# Patient Record
Sex: FEMALE | Race: WHITE | NOT HISPANIC OR LATINO | Employment: FULL TIME | URBAN - METROPOLITAN AREA
[De-identification: names, ages, dates, MRNs, and addresses within clinical notes are randomized per-mention and may not be internally consistent; named-entity substitution may affect disease eponyms.]

---

## 2020-12-15 ENCOUNTER — TELEMEDICINE (OUTPATIENT)
Dept: FAMILY MEDICINE CLINIC | Facility: TELEHEALTH | Age: 35
End: 2020-12-15

## 2020-12-15 DIAGNOSIS — L30.9 ECZEMA, UNSPECIFIED TYPE: Primary | ICD-10-CM

## 2020-12-15 PROCEDURE — 99213 OFFICE O/P EST LOW 20 MIN: CPT | Performed by: NURSE PRACTITIONER

## 2020-12-16 NOTE — PROGRESS NOTES
GABRIELLA Carpenter is a 35 y.o. female  presents with complaint of rash on right chin area for about 2 months. Itchy, red and flaky. Used triple antibiotic ointment for about 2 weeks which improved slightly. Area worsens when wearing a mask. Denies any other symptoms.     Review of Systems   Constitutional: Negative for fever.   HENT: Negative.    Respiratory: Negative for cough.    Cardiovascular: Negative.    Gastrointestinal: Negative.    Skin: Positive for rash (chin).       No past medical history on file.    No family history on file.           There were no vitals taken for this visit.    PHYSICAL EXAM  Physical Exam   Constitutional: She appears well-developed and well-nourished.   HENT:   Head: Normocephalic.       Nose: Nose normal.   Neck: Neck normal appearance.  Pulmonary/Chest: Effort normal.   Neurological: She is alert.   Psychiatric: She has a normal mood and affect. Her speech is normal.       Diagnoses and all orders for this visit:    1. Eczema, unspecified type (Primary)  -     triamcinolone (KENALOG) 0.1 % ointment; Apply  topically to the appropriate area as directed Daily As Needed for Rash for up to 5 days. chin  Dispense: 80 g; Refill: 0  -     mupirocin (Bactroban) 2 % ointment; Apply  topically to the appropriate area as directed 3 (Three) Times a Day for 7 days. chin  Dispense: 30 g; Refill: 0    *Use triamcinolone cream as prescribed. May use 1-2 times a day for the first 2-3 days, but then decrease use. Discussed with patient side effects of steroid cream and to use as little as possible.  Apply vaseline or aquaphor to area every night.   Wash mask often or use a new mask when possible.   She is concerned about impetigo which I discussed did not appear to be present at this time. Sent in mupirocin to use if honey-crust lesions do appear. She verbalized understanding.       FOLLOW-UP  As discussed during visit with [facility], if symptoms worsen or fail to improve, follow-up with  PCP/Urgent Care/Emergency Department.    Patient verbalizes understanding of medications, instructions for treatment and follow-up.    Lachelle Dunne, APRN  12/15/2020  19:07 EST    This visit was performed via Telehealth.  This patient has been instructed to follow-up with their primary care provider if their symptoms worsen or the treatment provided does not resolve their illness.

## 2020-12-16 NOTE — PATIENT INSTRUCTIONS
*Use triamcinolone cream as prescribed. May use 1-2 times a day for the first 2-3 days, but then decrease use. Use as little as possible due to side effects.   *Apply vaseline or aquaphor to area every night.   *Wash mask often or use a new mask when possible.   *Use mupirocin if honey-crusted lesions do appear which would indicate impetigo.    Eczema  Eczema is a broad term for a group of skin conditions that cause skin to become rough and inflamed. Each type of eczema has different triggers, symptoms, and treatments. Eczema of any type is usually itchy and symptoms range from mild to severe.  Eczema and its symptoms are not spread from person to person (are not contagious). It can appear on different parts of the body at different times. Your eczema may not look the same as someone else's eczema.  What are the types of eczema?  Atopic dermatitis  This is a long-term (chronic) skin disease that keeps coming back (recurring). Usual symptoms are dry skin and small, solid pimples that may swell and leak fluid (weep).  Contact dermatitis    This happens when something irritates the skin and causes a rash. The irritation can come from substances that you are allergic to (allergens), such as poison ivy, chemicals, or medicines that were applied to your skin.  Dyshidrotic eczema  This is a form of eczema on the hands and feet. It shows up as very itchy, fluid-filled blisters. It can affect people of any age, but is more common before age 40.  Hand eczema    This causes very itchy areas of skin on the palms and sides of the hands and fingers. This type of eczema is common in industrial jobs where you may be exposed to many different types of irritants.  Lichen simplex chronicus  This type of eczema occurs when a person constantly scratches one area of the body. Repeated scratching of the area leads to thickened skin (lichenification). Lichen simplex chronicus can occur along with other types of eczema. It is more common in  "adults, but may be seen in children as well.  Nummular eczema  This is a common type of eczema. It has no known cause. It typically causes a red, circular, crusty lesion (plaque) that may be itchy. Scratching may become a habit and can cause bleeding. Nummular eczema occurs most often in people of middle-age or older. It most often affects the hands.  Seborrheic dermatitis  This is a common skin disease that mainly affects the scalp. It may also affect any oily areas of the body, such as the face, sides of nose, eyebrows, ears, eyelids, and chest. It is marked by small scaling and redness of the skin (erythema). This can affect people of all ages. In infants, this condition is known as \"cradle cap.\"  Stasis dermatitis  This is a common skin disease that usually appears on the legs and feet. It most often occurs in people who have a condition that prevents blood from being pumped through the veins in the legs (chronic venous insufficiency). Stasis dermatitis is a chronic condition that needs long-term management.  How is eczema diagnosed?  Your health care provider will examine your skin and review your medical history. He or she may also give you skin patch tests. These tests involve taking patches that contain possible allergens and placing them on your back. He or she will then check in a few days to see if an allergic reaction occurred.  What are the common treatments?  Treatment for eczema is based on the type of eczema you have. Hydrocortisone steroid medicine can relieve itching quickly and help reduce inflammation. This medicine may be prescribed or obtained over-the-counter, depending on the strength of the medicine that is needed.  Follow these instructions at home:  · Take over-the-counter and prescription medicines only as told by your health care provider.  · Use creams or ointments to moisturize your skin. Do not use lotions.  · Learn what triggers or irritates your symptoms. Avoid these " things.  · Treat symptom flare-ups quickly.  · Do not itch your skin. This can make your rash worse.  · Keep all follow-up visits as told by your health care provider. This is important.  Where to find more information  · The American Academy of Dermatology: www.aad.org  · The National Eczema Association: www.nationaleczema.org  Contact a health care provider if:  · You have serious itching, even with treatment.  · You regularly scratch your skin until it bleeds.  · Your rash looks different than usual.  · Your skin is painful, swollen, or more red than usual.  · You have a fever.  Summary  · There are eight general types of eczema. Each type has different triggers.  · Eczema of any type causes itching that may range from mild to severe.  · Treatment varies based on the type of eczema you have. Hydrocortisone steroid medicine can help with itching and inflammation.  · Protecting your skin is the best way to prevent eczema. Use moisturizers and lotions. Avoid triggers and irritants, and treat flare-ups quickly.  This information is not intended to replace advice given to you by your health care provider. Make sure you discuss any questions you have with your health care provider.  Document Revised: 11/30/2018 Document Reviewed: 05/03/2018  Elsevier Patient Education © 2020 Elsevier Inc.

## 2020-12-28 ENCOUNTER — TELEPHONE (OUTPATIENT)
Dept: NEUROLOGY | Facility: CLINIC | Age: 35
End: 2020-12-28

## 2020-12-28 DIAGNOSIS — G43.719 INTRACTABLE CHRONIC MIGRAINE WITHOUT AURA AND WITHOUT STATUS MIGRAINOSUS: Primary | ICD-10-CM

## 2020-12-28 RX ORDER — SUMATRIPTAN 100 MG/1
100 TABLET, FILM COATED ORAL ONCE AS NEEDED
Qty: 14 TABLET | Refills: 3 | Status: SHIPPED | OUTPATIENT
Start: 2020-12-28 | End: 2021-05-26 | Stop reason: SDUPTHER

## 2020-12-28 NOTE — TELEPHONE ENCOUNTER
Called and spoke with patient. Scheduled her an appointment for December 30th at 8:30am.     Requested records from [facility].     Patient stated she is completley out of medicine.     Patient is currently taking Imitrex 100mg    [facility]

## 2020-12-28 NOTE — TELEPHONE ENCOUNTER
Does she have an upcoming appointment with me? I have no documentation from [facility] to review. Can we get that?

## 2020-12-28 NOTE — TELEPHONE ENCOUNTER
PATIENT CALLED AND STATED SHE USED TO SEE [facility]UER[facility] IN [place] AND WAS NEEDING A REFILL ON RX IMITREX.     PLEASE ADVISE PATIENT WITH AN UPDATE.    PH: [phone]

## 2020-12-29 NOTE — TELEPHONE ENCOUNTER
NUVIA CALLED AGAIN WANTING TO KNOW ABOUT MEDICINE. I ADVISED HER OF THE PREVIOUS NOTES THAT ARE IN THIS ENCOUNTER AND ALSO OF HER UPCOMING APPT.    PLEASE ADVISE PATIENT WITH AN UPDATE. SHE IS WORRIED ABOUT NOT HAVING HER MEDICINE.    PH: [phone]

## 2020-12-30 ENCOUNTER — OFFICE VISIT (OUTPATIENT)
Dept: NEUROLOGY | Facility: CLINIC | Age: 35
End: 2020-12-30

## 2020-12-30 VITALS
DIASTOLIC BLOOD PRESSURE: 80 MMHG | HEIGHT: 66 IN | WEIGHT: 167 LBS | SYSTOLIC BLOOD PRESSURE: 100 MMHG | BODY MASS INDEX: 26.84 KG/M2 | HEART RATE: 81 BPM | TEMPERATURE: 97.5 F | OXYGEN SATURATION: 98 %

## 2020-12-30 DIAGNOSIS — G47.19 EXCESSIVE DAYTIME SLEEPINESS: ICD-10-CM

## 2020-12-30 DIAGNOSIS — G43.719 INTRACTABLE CHRONIC MIGRAINE WITHOUT AURA AND WITHOUT STATUS MIGRAINOSUS: Primary | ICD-10-CM

## 2020-12-30 DIAGNOSIS — G43.829 MENSTRUAL MIGRAINE WITHOUT STATUS MIGRAINOSUS, NOT INTRACTABLE: ICD-10-CM

## 2020-12-30 PROCEDURE — 99214 OFFICE O/P EST MOD 30 MIN: CPT | Performed by: NURSE PRACTITIONER

## 2020-12-30 PROCEDURE — 96372 THER/PROPH/DIAG INJ SC/IM: CPT | Performed by: NURSE PRACTITIONER

## 2020-12-30 RX ORDER — PREDNISONE 20 MG/1
40 TABLET ORAL DAILY
Qty: 10 TABLET | Refills: 0 | Status: SHIPPED | OUTPATIENT
Start: 2020-12-30 | End: 2021-01-04

## 2020-12-30 NOTE — PATIENT INSTRUCTIONS
Migraine Headache  A migraine headache is a very strong throbbing pain on one side or both sides of your head. This type of headache can also cause other symptoms. It can last from 4 hours to 3 days. Talk with your doctor about what things may bring on (trigger) this condition.  What are the causes?  The exact cause of this condition is not known. This condition may be triggered or caused by:  · Drinking alcohol.  · Smoking.  · Taking medicines, such as:  ? Medicine used to treat chest pain (nitroglycerin).  ? Birth control pills.  ? Estrogen.  ? Some blood pressure medicines.  · Eating or drinking certain products.  · Doing physical activity.  Other things that may trigger a migraine headache include:  · Having a menstrual period.  · Pregnancy.  · Hunger.  · Stress.  · Not getting enough sleep or getting too much sleep.  · Weather changes.  TirednGalcanezumab injection  What is this medicine?  GALCANEZUMAB (gal ka RACHEL ue mab) is used to prevent migraines and treat cluster headaches.  This medicine may be used for other purposes; ask your health care provider or pharmacist if you have questions.  COMMON BRAND NAME(S): Emgality  What should I tell my health care provider before I take this medicine?  They need to know if you have any of these conditions:  · an unusual or allergic reaction to galcanezumab, other medicines, foods, dyes, or preservatives  · pregnant or trying to get pregnant  · breast-feeding  How should I use this medicine?  This medicine is for injection under the skin. You will be taught how to prepare and give this medicine. Use exactly as directed. Take your medicine at regular intervals. Do not take your medicine more often than directed.  It is important that you put your used needles and syringes in a special sharps container. Do not put them in a trash can. If you do not have a sharps container, call your pharmacist or healthcare provider to get one.  Talk to your pediatrician regarding the use  of this medicine in children. Special care may be needed.  Overdosage: If you think you have taken too much of this medicine contact a poison control center or emergency room at once.  NOTE: This medicine is only for you. Do not share this medicine with others.  What if I miss a dose?  If you miss a dose, take it as soon as you can. If it is almost time for your next dose, take only that dose. Do not take double or extra doses.  What may interact with this medicine?  Interactions are not expected.  This list may not describe all possible interactions. Give your health care provider a list of all the medicines, herbs, non-prescription drugs, or dietary supplements you use. Also tell them if you smoke, drink alcohol, or use illegal drugs. Some items may interact with your medicine.  What should I watch for while using this medicine?  Tell your doctor or healthcare professional if your symptoms do not start to get better or if they get worse.  What side effects may I notice from receiving this medicine?  Side effects that you should report to your doctor or health care professional as soon as possible:  · allergic reactions like skin rash, itching or hives, swelling of the face, lips, or tongue  Side effects that usually do not require medical attention (report these to your doctor or health care professional if they continue or are bothersome):  · pain, redness, or irritation at site where injected  This list may not describe all possible side effects. Call your doctor for medical advice about side effects. You may report side effects to FDA at 1-550-FDA-0212.  Where should I keep my medicine?  Keep out of the reach of children.  You will be instructed on how to store this medicine. Throw away any unused medicine after the expiration date on the label.  NOTE: This sheet is a summary. It may not cover all possible information. If you have questions about this medicine, talk to your doctor, pharmacist, or health care  provider.  © 2020 Elsevier/Gold Standard (2019-06-05 12:03:23)    Sleep Apnea  Sleep apnea affects breathing during sleep. It causes breathing to stop for a short time or to become shallow. It can also increase the risk of:  Heart attack.  Stroke.  Being very overweight (obese).  Diabetes.  Heart failure.  Irregular heartbeat.  The goal of treatment is to help you breathe normally again.  What are the causes?  There are three kinds of sleep apnea:  Obstructive sleep apnea. This is caused by a blocked or collapsed airway.  Central sleep apnea. This happens when the brain does not send the right signals to the muscles that control breathing.  Mixed sleep apnea. This is a combination of obstructive and central sleep apnea.  The most common cause of this condition is a collapsed or blocked airway. This can happen if:  Your throat muscles are too relaxed.  Your tongue and tonsils are too large.  You are overweight.  Your airway is too small.  What increases the risk?  Being overweight.  Smoking.  Having a small airway.  Being older.  Being male.  Drinking alcohol.  Taking medicines to calm yourself (sedatives or tranquilizers).  Having family members with the condition.  What are the signs or symptoms?  Trouble staying asleep.  Being sleepy or tired during the day.  Getting angry a lot.  Loud snoring.  Headaches in the morning.  Not being able to focus your mind (concentrate).  Forgetting things.  Less interest in sex.  Mood swings.  Personality changes.  Feelings of sadness (depression).  Waking up a lot during the night to pee (urinate).  Dry mouth.  Sore throat.  How is this diagnosed?  Your medical history.  A physical exam.  A test that is done when you are sleeping (sleep study). The test is most often done in a sleep lab but may also be done at home.  How is this treated?    Sleeping on your side.  Using a medicine to get rid of mucus in your nose (decongestant).  Avoiding the use of alcohol, medicines to help you  relax, or certain pain medicines (narcotics).  Losing weight, if needed.  Changing your diet.  Not smoking.  Using a machine to open your airway while you sleep, such as:  An oral appliance. This is a mouthpiece that shifts your lower jaw forward.  A CPAP device. This device blows air through a mask when you breathe out (exhale).  An EPAP device. This has valves that you put in each nostril.  A BPAP device. This device blows air through a mask when you breathe in (inhale) and breathe out.  Having surgery if other treatments do not work.  It is important to get treatment for sleep apnea. Without treatment, it can lead to:  High blood pressure.  Coronary artery disease.  In men, not being able to have an erection (impotence).  Reduced thinking ability.  Follow these instructions at home:  Lifestyle  Make changes that your doctor recommends.  Eat a healthy diet.  Lose weight if needed.  Avoid alcohol, medicines to help you relax, and some pain medicines.  Do not use any products that contain nicotine or tobacco, such as cigarettes, e-cigarettes, and chewing tobacco. If you need help quitting, ask your doctor.  General instructions  Take over-the-counter and prescription medicines only as told by your doctor.  If you were given a machine to use while you sleep, use it only as told by your doctor.  If you are having surgery, make sure to tell your doctor you have sleep apnea. You may need to bring your device with you.  Keep all follow-up visits as told by your doctor. This is important.  Contact a doctor if:  The machine that you were given to use during sleep bothers you or does not seem to be working.  You do not get better.  You get worse.  Get help right away if:  Your chest hurts.  You have trouble breathing in enough air.  You have an uncomfortable feeling in your back, arms, or stomach.  You have trouble talking.  One side of your body feels weak.  A part of your face is hanging down.  These symptoms may be an  emergency. Do not wait to see if the symptoms will go away. Get medical help right away. Call your local emergency services (911 in the [place] Do not drive yourself to the hospital.  Summary  This condition affects breathing during sleep.  The most common cause is a collapsed or blocked airway.  The goal of treatment is to help you breathe normally while you sleep.  This information is not intended to replace advice given to you by your health care provider. Make sure you discuss any questions you have with your health care provider.  Document Revised: 10/04/2019 Document Reviewed: 08/13/2019  GoodRx Patient Education © 2020 [employer] Inc.  · ess (fatigue).  What increases the risk?  · Being 25-55 years old.  · Being female.  · Having a family history of migraine headaches.  · Being .  · Having depression or anxiety.  · Being very overweight.  What are the signs or symptoms?  · A throbbing pain. This pain may:  ? Happen in any area of the head, such as on one side or both sides.  ? Make it hard to do daily activities.  ? Get worse with physical activity.  ? Get worse around bright lights or loud noises.  · Other symptoms may include:  ? Feeling sick to your stomach (nauseous).  ? Vomiting.  ? Dizziness.  ? Being sensitive to bright lights, loud noises, or smells.  · Before you get a migraine headache, you may get warning signs (an aura). An aura may include:  ? Seeing flashing lights or having blind spots.  ? Seeing bright spots, halos, or zigzag lines.  ? Having tunnel vision or blurred vision.  ? Having numbness or a tingling feeling.  ? Having trouble talking.  ? Having weak muscles.  · Some people have symptoms after a migraine headache (postdromal phase), such as:  ? Tiredness.  ? Trouble thinking (concentrating).  How is this treated?  · Taking medicines that:  ? Relieve pain.  ? Relieve the feeling of being sick to your stomach.  ? Prevent migraine headaches.  · Treatment may also include:  ? Having  acupuncture.  ? Avoiding foods that bring on migraine headaches.  ? Learning ways to control your body functions (biofeedback).  ? Therapy to help you know and deal with negative thoughts (cognitive behavioral therapy).  Follow these instructions at home:  Medicines  · Take over-the-counter and prescription medicines only as told by your doctor.  · Ask your doctor if the medicine prescribed to you:  ? Requires you to avoid driving or using heavy machinery.  ? Can cause trouble pooping (constipation). You may need to take these steps to prevent or treat trouble pooping:  § Drink enough fluid to keep your pee (urine) pale yellow.  § Take over-the-counter or prescription medicines.  § Eat foods that are high in fiber. These include beans, whole grains, and fresh fruits and vegetables.  § Limit foods that are high in fat and sugar. These include fried or sweet foods.  Lifestyle  · Do not drink alcohol.  · Do not use any products that contain nicotine or tobacco, such as cigarettes, e-cigarettes, and chewing tobacco. If you need help quitting, ask your doctor.  · Get at least 8 hours of sleep every night.  · Limit and deal with stress.  General instructions         · Keep a journal to find out what may bring on your migraine headaches. For example, write down:  ? What you eat and drink.  ? How much sleep you get.  ? Any change in what you eat or drink.  ? Any change in your medicines.  · If you have a migraine headache:  ? Avoid things that make your symptoms worse, such as bright lights.  ? It may help to lie down in a dark, quiet room.  ? Do not drive or use heavy machinery.  ? Ask your doctor what activities are safe for you.  · Keep all follow-up visits as told by your doctor. This is important.  Contact a doctor if:  · You get a migraine headache that is different or worse than others you have had.  · You have more than 15 headache days in one month.  Get help right away if:  · Your migraine headache gets very  bad.  · Your migraine headache lasts longer than 72 hours.  · You have a fever.  · You have a stiff neck.  · You have trouble seeing.  · Your muscles feel weak or like you cannot control them.  · You start to lose your balance a lot.  · You start to have trouble walking.  · You pass out (faint).  · You have a seizure.  Summary  · A migraine headache is a very strong throbbing pain on one side or both sides of your head. These headaches can also cause other symptoms.  · This condition may be treated with medicines and changes to your lifestyle.  · Keep a journal to find out what may bring on your migraine headaches.  · Contact a doctor if you get a migraine headache that is different or worse than others you have had.  · Contact your doctor if you have more than 15 headache days in a month.  This information is not intended to replace advice given to you by your health care provider. Make sure you discuss any questions you have with your health care provider.  Document Revised: 04/10/2020 Document Reviewed: 01/30/2020  Elsevier Patient Education © 2020 Elsevier Inc.

## 2020-12-30 NOTE — PROGRESS NOTES
New Headache Note      Patient Name: Zeenat Carpenter  : 1985   MRN: [id number]     Referring Physician: No ref. provider found    Chief Complaint:    Chief Complaint   Patient presents with   • Consult     patient in office to establish care for migraines.        History of Present Illness: Zeenat Carpenter is a 35 y.o. female who is here today to establish care with Neurology for headaches.  She was last seen by me at [facility] on 3/6/2018.  She has had 10/30 headache days in the past month with 4-5 being migraines.  She is taking Imitrex 100mg as needed and this does help for about a day and then the migraine will often return.  She describes her migraines as mostly frontotemporal and behind her eyes, some times only on one side, not preceded by an aura.  She feels she has cluster migraines 4 days before she starts her menstrual period.  These migraines are quite severe.  She had an MRI head ordered at her previous visit but she did not have that done.      Sleep questionnaire reviewed.  [place] score 11.  She denies snoring.  She does not wake up feeling rested.  She wakes up with a dry mouth at times.  She wakes up a couple of times during the night.  She falls asleep easily.      Preventative medications tried: Topiramate experienced severe mental cloudiness, Nortriptyline did not help much  Abortive medications tried: Tylenol, Ibuprofen, Fioricet    Subjective      Review of Systems:   Review of Systems   Constitutional: Negative for fatigue, fever, unexpected weight gain and unexpected weight loss.   HENT: Negative for hearing loss, sore throat, swollen glands, tinnitus and trouble swallowing.    Eyes: Negative for blurred vision, double vision, photophobia and visual disturbance.   Respiratory: Negative for cough, chest tightness and shortness of breath.    Cardiovascular: Negative for chest pain, palpitations and leg swelling.   Gastrointestinal: Negative for constipation,  diarrhea and nausea.   Endocrine: Negative for cold intolerance and heat intolerance.   Musculoskeletal: Negative for gait problem, neck pain and neck stiffness.   Skin: Negative for color change and rash.   Allergic/Immunologic: Negative for environmental allergies and food allergies.   Neurological: Positive for headache. Negative for dizziness, syncope, facial asymmetry, speech difficulty, weakness, memory problem and confusion.   Psychiatric/Behavioral: Negative for agitation, behavioral problems and depressed mood. The patient is not nervous/anxious.        Past Medical History:   Past Medical History:   Diagnosis Date   • Migraine        Past Surgical History: History reviewed. No pertinent surgical history.    Family History:   Family History   Problem Relation Age of Onset   • Migraines Sister        Social History:   Social History     Socioeconomic History   • Marital status: Unknown     Spouse name: Not on file   • Number of children: Not on file   • Years of education: Not on file   • Highest education level: Not on file   Tobacco Use   • Smoking status: Never Smoker   • Smokeless tobacco: Never Used   Substance and Sexual Activity   • Alcohol use: Yes     Comment: occas   • Drug use: Never   • Sexual activity: Defer       Medications:     Current Outpatient Medications:   •  SUMAtriptan (Imitrex) 100 MG tablet, Take 1 tablet by mouth 1 (One) Time As Needed for Migraine., Disp: 14 tablet, Rfl: 3  •  galcanezumab-gnlm (EMGALITY) 120 MG/ML prefilled syringe, Inject 2 mL under the skin into the appropriate area as directed 1 (One) Time for 1 dose., Disp: 1.12 mL, Rfl: 0  •  galcanezumab-gnlm (EMGALITY) 120 MG/ML prefilled syringe, Inject 1 mL under the skin into the appropriate area as directed Every 28 (Twenty-Eight) Days., Disp: 1.12 mL, Rfl: 11    Current Facility-Administered Medications:   •  Galcanezumab-gnlm solution prefilled syringe 240 mg, 240 mg, Subcutaneous, Once, Roxanne Connolly,  "APRN    Allergies:   Allergies   Allergen Reactions   • Aspirin Swelling     Swells patients throat shut       Objective     Physical Exam:  Vital Signs:   Vitals:    12/30/20 0843   BP: 100/80   BP Location: Left arm   Patient Position: Sitting   Cuff Size: Adult   Pulse: 81   Temp: 97.5 °F (36.4 °C)   SpO2: 98%   Weight: 75.8 kg (167 lb)   Height: 167.6 cm (66\")   PainSc: 0-No pain     Body mass index is 26.95 kg/m².     Physical Exam  Vitals signs and nursing note reviewed.   Constitutional:       General: She is not in acute distress.     Appearance: She is well-developed. She is not diaphoretic.   HENT:      Head: Normocephalic and atraumatic.   Eyes:      Conjunctiva/sclera: Conjunctivae normal.      Pupils: Pupils are equal, round, and reactive to light.   Neck:      Musculoskeletal: Normal range of motion and neck supple.   Cardiovascular:      Rate and Rhythm: Normal rate and regular rhythm.      Heart sounds: Normal heart sounds. No murmur. No friction rub. No gallop.    Pulmonary:      Effort: Pulmonary effort is normal. No respiratory distress.      Breath sounds: Normal breath sounds. No wheezing or rales.   Musculoskeletal: Normal range of motion.   Skin:     General: Skin is warm and dry.      Findings: No rash.   Neurological:      General: No focal deficit present.      Mental Status: She is alert and oriented to person, place, and time.      Coordination: Finger-Nose-Finger Test normal.      Gait: Gait is intact.   Psychiatric:         Speech: Speech normal.         Behavior: Behavior normal.         Thought Content: Thought content normal.         Neurologic Exam     Mental Status   Oriented to person, place, and time.   Attention: normal. Concentration: normal.   Speech: speech is normal   Level of consciousness: alert  Knowledge: good.     Cranial Nerves   Cranial nerves II through XII intact.     CN II   Visual fields full to confrontation.     CN III, IV, VI   Pupils are equal, round, and " reactive to light.  Right pupil: Size: 3 mm. Shape: regular. Reactivity: brisk.   Left pupil: Size: 3 mm. Shape: regular. Reactivity: brisk.   CN III: no CN III palsy  CN VI: no CN VI palsy  Nystagmus: none     CN V   Facial sensation intact.     CN VII   Facial expression full, symmetric.     CN VIII   CN VIII normal.     CN IX, X   CN IX normal.   CN X normal.     CN XI   CN XI normal.     CN XII   CN XII normal.     Motor Exam   Muscle bulk: normal  Overall muscle tone: normal    Strength   Right biceps: 5/5  Left biceps: 5/5  Right triceps: 5/5  Left triceps: 5/5  Right quadriceps: 5/5  Left quadriceps: 5/5  Right hamstrin/5  Left hamstrin/5    Sensory Exam   Light touch normal.     Gait, Coordination, and Reflexes     Gait  Gait: normal    Coordination   Finger to nose coordination: normal    Tremor   Resting tremor: absent  Intention tremor: absent  Action tremor: absent      Assessment / Plan      Assessment/Plan:   Diagnoses and all orders for this visit:    1. Intractable chronic migraine without aura and without status migrainosus (Primary)  -     Galcanezumab-gnlm solution prefilled syringe 240 mg  -     galcanezumab-gnlm (EMGALITY) 120 MG/ML prefilled syringe; Inject 2 mL under the skin into the appropriate area as directed 1 (One) Time for 1 dose.  Dispense: 1.12 mL; Refill: 0  -     galcanezumab-gnlm (EMGALITY) 120 MG/ML prefilled syringe; Inject 1 mL under the skin into the appropriate area as directed Every 28 (Twenty-Eight) Days.  Dispense: 1.12 mL; Refill: 11  -     MRI Brain With & Without Contrast; Future  -     Home Sleep Study; Future  - Printed patient education on Migraines, ELIECER and Emgality provided today.     2. Menstrual migraine without status migrainosus, not intractable  -     galcanezumab-gnlm (EMGALITY) 120 MG/ML prefilled syringe; Inject 2 mL under the skin into the appropriate area as directed 1 (One) Time for 1 dose.  Dispense: 1.12 mL; Refill: 0    3. Excessive daytime  sleepiness  -     Home Sleep Study; Future  - Advised patient to avoid driving if drowsy.     4. BMI 27.0-27.9,adult  -     Home Sleep Study; Future         Follow Up:   Return in about 4 weeks (around 1/27/2021) for Recheck.    TED Matos, FNP-C  [facility]       Please note that portions of this note may have been completed with a voice recognition program. Efforts were made to edit the dictations, but occasionally words are mistranscribed.

## 2021-01-11 ENCOUNTER — HOSPITAL ENCOUNTER (OUTPATIENT)
Dept: SLEEP MEDICINE | Facility: HOSPITAL | Age: 36
Discharge: HOME | End: 2021-01-11
Admitting: NURSE PRACTITIONER

## 2021-01-11 DIAGNOSIS — G43.719 INTRACTABLE CHRONIC MIGRAINE WITHOUT AURA AND WITHOUT STATUS MIGRAINOSUS: ICD-10-CM

## 2021-01-11 DIAGNOSIS — G47.19 EXCESSIVE DAYTIME SLEEPINESS: ICD-10-CM

## 2021-01-11 PROCEDURE — 95806 SLEEP STUDY UNATT&RESP EFFT: CPT | Performed by: INTERNAL MEDICINE

## 2021-01-11 PROCEDURE — 95806 SLEEP STUDY UNATT&RESP EFFT: CPT

## 2021-01-22 ENCOUNTER — HOSPITAL ENCOUNTER (OUTPATIENT)
Dept: MRI IMAGING | Facility: HOSPITAL | Age: 36
Discharge: HOME | End: 2021-01-22
Admitting: NURSE PRACTITIONER

## 2021-01-22 DIAGNOSIS — G43.719 INTRACTABLE CHRONIC MIGRAINE WITHOUT AURA AND WITHOUT STATUS MIGRAINOSUS: ICD-10-CM

## 2021-01-22 PROCEDURE — 70553 MRI BRAIN STEM W/O & W/DYE: CPT

## 2021-01-22 PROCEDURE — 0 GADOBENATE DIMEGLUMINE 529 MG/ML SOLUTION: Performed by: NURSE PRACTITIONER

## 2021-01-22 PROCEDURE — A9577 INJ MULTIHANCE: HCPCS | Performed by: NURSE PRACTITIONER

## 2021-01-22 RX ADMIN — GADOBENATE DIMEGLUMINE 15 ML: 529 INJECTION, SOLUTION INTRAVENOUS at 10:43

## 2021-01-27 ENCOUNTER — OFFICE VISIT (OUTPATIENT)
Dept: NEUROLOGY | Facility: CLINIC | Age: 36
End: 2021-01-27

## 2021-01-27 VITALS
SYSTOLIC BLOOD PRESSURE: 100 MMHG | OXYGEN SATURATION: 98 % | HEART RATE: 80 BPM | HEIGHT: 66 IN | WEIGHT: 165 LBS | DIASTOLIC BLOOD PRESSURE: 80 MMHG | BODY MASS INDEX: 26.52 KG/M2 | TEMPERATURE: 97.3 F

## 2021-01-27 DIAGNOSIS — G43.719 INTRACTABLE CHRONIC MIGRAINE WITHOUT AURA AND WITHOUT STATUS MIGRAINOSUS: Primary | ICD-10-CM

## 2021-01-27 PROCEDURE — 99213 OFFICE O/P EST LOW 20 MIN: CPT | Performed by: NURSE PRACTITIONER

## 2021-01-27 NOTE — PROGRESS NOTES
Follow Up Office Visit      Patient Name: Zeenat Carpenter  : 1985   MRN: [id number]     Chief Complaint:    Chief Complaint   Patient presents with   • Follow-up     patient in office to follow up on migraines.        History of Present Illness: Zeenat Carpenter is a 35 y.o. female who is here today to follow up with migraines.  She had a dose of Emgality at her previous appointment and is due her next injection tomorrow-she will be picking that up from the pharmacy.  She has had 1 migraine in the past month and that was around the time of her period.  She had also had a couple of glasses of wine and thinks that could have caused the migraine.  She took Imitrex with relief.  She had a home sleep study on 2021 and it showed an AHI of 1.2/hour.  She feels she is doing much better since her last visit.      Following taken from previous visit note: Zeenat Carpenter is a 35 y.o. female who is here today to establish care with Neurology for headaches.  She was last seen by me at [facility] on 3/6/2018.  She has had 10/30 headache days in the past month with 4-5 being migraines.  She is taking Imitrex 100mg as needed and this does help for about a day and then the migraine will often return.  She describes her migraines as mostly frontotemporal and behind her eyes, some times only on one side, not preceded by an aura.  She feels she has cluster migraines 4 days before she starts her menstrual period.  These migraines are quite severe.  She had an MRI head ordered at her previous visit but she did not have that done.       Sleep questionnaire reviewed.  [place] score 11.  She denies snoring.  She does not wake up feeling rested.  She wakes up with a dry mouth at times.  She wakes up a couple of times during the night.  She falls asleep easily.       Preventative medications tried: Topiramate experienced severe mental cloudiness, Nortriptyline did not help much  Abortive medications tried:  Tylenol, Ibuprofen, Fioricet    Subjective      Review of Systems:   Review of Systems   Constitutional: Negative for chills, fatigue and fever.   HENT: Negative for facial swelling, hearing loss, sore throat, tinnitus and trouble swallowing.    Eyes: Negative for blurred vision, double vision, photophobia and visual disturbance.   Respiratory: Negative for cough, chest tightness and shortness of breath.    Cardiovascular: Negative for chest pain, palpitations and leg swelling.   Gastrointestinal: Negative for abdominal pain, nausea and vomiting.   Endocrine: Negative for cold intolerance and heat intolerance.   Musculoskeletal: Negative for gait problem, neck pain and neck stiffness.   Skin: Negative for color change and rash.   Allergic/Immunologic: Negative for environmental allergies and food allergies.   Neurological: Positive for headache. Negative for dizziness, syncope, speech difficulty, weakness, light-headedness, numbness and memory problem.   Psychiatric/Behavioral: Negative for behavioral problems, sleep disturbance and depressed mood. The patient is not nervous/anxious.        I have reviewed and the following portions of the patient's history were updated as appropriate: past family history, past medical history, past social history, past surgical history and problem list.    Medications:     Current Outpatient Medications:   •  galcanezumab-gnlm (EMGALITY) 120 MG/ML prefilled syringe, Inject 1 mL under the skin into the appropriate area as directed Every 28 (Twenty-Eight) Days., Disp: 1.12 mL, Rfl: 11  •  SUMAtriptan (Imitrex) 100 MG tablet, Take 1 tablet by mouth 1 (One) Time As Needed for Migraine., Disp: 14 tablet, Rfl: 3    Allergies:   Allergies   Allergen Reactions   • Aspirin Swelling     Swells patients throat shut       Objective     Physical Exam:  Vital Signs:   Vitals:    01/27/21 0835   BP: 100/80   BP Location: Left arm   Patient Position: Sitting   Cuff Size: Adult   Pulse: 80   Temp:  "97.3 °F (36.3 °C)   SpO2: 98%   Weight: 74.8 kg (165 lb)   Height: 167.6 cm (66\")   PainSc: 0-No pain     Body mass index is 26.63 kg/m².    Physical Exam  Vitals signs and nursing note reviewed.   Constitutional:       General: She is not in acute distress.     Appearance: Normal appearance. She is well-developed. She is not diaphoretic.   HENT:      Head: Normocephalic and atraumatic.   Eyes:      Conjunctiva/sclera: Conjunctivae normal.      Pupils: Pupils are equal, round, and reactive to light.   Neck:      Musculoskeletal: Neck supple.   Cardiovascular:      Rate and Rhythm: Normal rate and regular rhythm.      Heart sounds: Normal heart sounds. No murmur. No friction rub. No gallop.    Pulmonary:      Effort: Pulmonary effort is normal. No respiratory distress.   Musculoskeletal: Normal range of motion.   Skin:     General: Skin is warm and dry.      Findings: No rash.   Neurological:      Mental Status: She is alert and oriented to person, place, and time.   Psychiatric:         Behavior: Behavior normal.         Thought Content: Thought content normal.         Neurologic Exam     Mental Status   Oriented to person, place, and time.     Cranial Nerves     CN III, IV, VI   Pupils are equal, round, and reactive to light.       Assessment / Plan      Assessment/Plan:   Diagnoses and all orders for this visit:    1. Intractable chronic migraine without aura and without status migrainosus (Primary)  - Continue current treatment plan with Emgality and Imitrex. Try to avoid triggers.     2. BMI 26.0-26.9,adult         Follow Up:   Return in about 4 months (around 5/27/2021) for Recheck.    TED Matos, FNP-C  [facility]       Please note that portions of this note may have been completed with a voice recognition program. Efforts were made to edit the dictations, but occasionally words are mistranscribed.   "

## 2021-04-01 ENCOUNTER — OFFICE VISIT (OUTPATIENT)
Dept: INTERNAL MEDICINE | Facility: CLINIC | Age: 36
End: 2021-04-01

## 2021-04-01 VITALS
OXYGEN SATURATION: 100 % | DIASTOLIC BLOOD PRESSURE: 78 MMHG | RESPIRATION RATE: 15 BRPM | SYSTOLIC BLOOD PRESSURE: 111 MMHG | BODY MASS INDEX: 25.88 KG/M2 | HEART RATE: 69 BPM | TEMPERATURE: 97.7 F | HEIGHT: 66 IN | WEIGHT: 161 LBS

## 2021-04-01 DIAGNOSIS — Z00.00 PHYSICAL EXAM, ANNUAL: Primary | ICD-10-CM

## 2021-04-01 DIAGNOSIS — Z13.29 SCREENING FOR ENDOCRINE, NUTRITIONAL, METABOLIC AND IMMUNITY DISORDER: ICD-10-CM

## 2021-04-01 DIAGNOSIS — Z13.228 SCREENING FOR ENDOCRINE, NUTRITIONAL, METABOLIC AND IMMUNITY DISORDER: ICD-10-CM

## 2021-04-01 DIAGNOSIS — F33.0 MILD EPISODE OF RECURRENT MAJOR DEPRESSIVE DISORDER (HCC): ICD-10-CM

## 2021-04-01 DIAGNOSIS — R21 RASH/SKIN ERUPTION: ICD-10-CM

## 2021-04-01 DIAGNOSIS — F41.9 ANXIETY: ICD-10-CM

## 2021-04-01 DIAGNOSIS — Z13.21 SCREENING FOR ENDOCRINE, NUTRITIONAL, METABOLIC AND IMMUNITY DISORDER: ICD-10-CM

## 2021-04-01 DIAGNOSIS — Z13.0 SCREENING FOR ENDOCRINE, NUTRITIONAL, METABOLIC AND IMMUNITY DISORDER: ICD-10-CM

## 2021-04-01 DIAGNOSIS — Z11.59 NEED FOR HEPATITIS C SCREENING TEST: ICD-10-CM

## 2021-04-01 PROCEDURE — 99395 PREV VISIT EST AGE 18-39: CPT | Performed by: NURSE PRACTITIONER

## 2021-04-01 RX ORDER — BUPROPION HYDROCHLORIDE 150 MG/1
150 TABLET ORAL DAILY
Qty: 30 TABLET | Refills: 1 | Status: SHIPPED | OUTPATIENT
Start: 2021-04-01 | End: 2021-06-01

## 2021-04-01 RX ORDER — HYDROXYZINE PAMOATE 25 MG/1
25 CAPSULE ORAL 3 TIMES DAILY PRN
COMMUNITY
End: 2021-05-26

## 2021-04-01 RX ORDER — DOXYCYCLINE 100 MG/1
100 TABLET ORAL DAILY
Qty: 30 TABLET | Refills: 0 | Status: SHIPPED | OUTPATIENT
Start: 2021-04-01 | End: 2021-05-01

## 2021-04-01 NOTE — PATIENT INSTRUCTIONS
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
## 2021-04-12 NOTE — PROGRESS NOTES
Please contact patient let her know results of Pap were negative but transformation zone was insufficient recommend repeat annual Pap.

## 2021-05-13 ENCOUNTER — OFFICE VISIT (OUTPATIENT)
Dept: BEHAVIORAL HEALTH | Facility: CLINIC | Age: 36
End: 2021-05-13

## 2021-05-13 VITALS
WEIGHT: 157 LBS | BODY MASS INDEX: 25.23 KG/M2 | HEIGHT: 66 IN | DIASTOLIC BLOOD PRESSURE: 68 MMHG | SYSTOLIC BLOOD PRESSURE: 116 MMHG

## 2021-05-13 DIAGNOSIS — F41.1 GENERALIZED ANXIETY DISORDER: ICD-10-CM

## 2021-05-13 DIAGNOSIS — F33.0 MILD EPISODE OF RECURRENT MAJOR DEPRESSIVE DISORDER (HCC): Primary | ICD-10-CM

## 2021-05-13 PROCEDURE — 90791 PSYCH DIAGNOSTIC EVALUATION: CPT | Performed by: COUNSELOR
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
## 2021-05-19 ENCOUNTER — OFFICE VISIT (OUTPATIENT)
Dept: BEHAVIORAL HEALTH | Facility: CLINIC | Age: 36
End: 2021-05-19

## 2021-05-19 VITALS
HEIGHT: 66 IN | DIASTOLIC BLOOD PRESSURE: 62 MMHG | WEIGHT: 157.6 LBS | SYSTOLIC BLOOD PRESSURE: 110 MMHG | BODY MASS INDEX: 25.33 KG/M2

## 2021-05-19 DIAGNOSIS — F33.0 MILD EPISODE OF RECURRENT MAJOR DEPRESSIVE DISORDER (HCC): Primary | ICD-10-CM

## 2021-05-19 PROCEDURE — 90834 PSYTX W PT 45 MINUTES: CPT | Performed by: COUNSELOR
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
## 2021-05-26 ENCOUNTER — OFFICE VISIT (OUTPATIENT)
Dept: NEUROLOGY | Facility: CLINIC | Age: 36
End: 2021-05-26

## 2021-05-26 DIAGNOSIS — G43.719 INTRACTABLE CHRONIC MIGRAINE WITHOUT AURA AND WITHOUT STATUS MIGRAINOSUS: Primary | ICD-10-CM

## 2021-05-26 PROCEDURE — 99442 PR PHYS/QHP TELEPHONE EVALUATION 11-20 MIN: CPT | Performed by: NURSE PRACTITIONER

## 2021-05-26 RX ORDER — SUMATRIPTAN 100 MG/1
100 TABLET, FILM COATED ORAL ONCE AS NEEDED
Qty: 14 TABLET | Refills: 11 | Status: SHIPPED | OUTPATIENT
Start: 2021-05-26 | End: 2022-06-22 | Stop reason: SDUPTHER

## 2021-05-26 NOTE — PROGRESS NOTES
Follow Up Office Visit      Patient Name: Zeenat Carpenter  : 1985   MRN: [id number]     Chief Complaint:    Chief Complaint   Patient presents with   • Follow-up     patient requested telephone visit. patient states she has noticed that the emgality injections have helped.        History of Present Illness: Zeenat Carpenter is a 35 y.o. female who presents today for a telephone visit, per patient request, to follow up for migraines.  She is taking Emgality and Imitrex and feels her migraines have improved greatly since starting the Emgality.  She denies any negative side effects from the medications.  She has had 1 mild migraine since her last visit in 2021.  She feels her quality of life has improved greatly since her migraines have been under better control.  She would like to continue her current therapy.      Following taken from previous visit note:  Zeenat Carpenter is a 35 y.o. female who is here today to follow up with migraines.  She had a dose of Emgality at her previous appointment and is due her next injection tomorrow-she will be picking that up from the pharmacy.  She has had 1 migraine in the past month and that was around the time of her period.  She had also had a couple of glasses of wine and thinks that could have caused the migraine.  She took Imitrex with relief.  She had a home sleep study on 2021 and it showed an AHI of 1.2/hour.  She feels she is doing much better since her last visit.      Subjective      Review of Systems:   Review of Systems   Constitutional: Negative for chills, fatigue and fever.   HENT: Negative for facial swelling, hearing loss, sore throat, tinnitus and trouble swallowing.    Eyes: Negative for blurred vision, double vision, photophobia and visual disturbance.   Respiratory: Negative for cough, chest tightness and shortness of breath.    Cardiovascular: Negative for chest pain, palpitations and leg swelling.   Gastrointestinal: Negative for  abdominal pain, nausea and vomiting.   Endocrine: Negative for cold intolerance and heat intolerance.   Musculoskeletal: Negative for gait problem, neck pain and neck stiffness.   Skin: Negative for color change and rash.   Allergic/Immunologic: Negative for environmental allergies and food allergies.   Neurological: Positive for headache. Negative for dizziness, syncope, speech difficulty, weakness, light-headedness, numbness and memory problem.   Psychiatric/Behavioral: Negative for behavioral problems, sleep disturbance and depressed mood. The patient is not nervous/anxious.        I have reviewed and the following portions of the patient's history were updated as appropriate: past family history, past medical history, past social history, past surgical history and problem list.    Medications:     Current Outpatient Medications:   •  buPROPion XL (Wellbutrin XL) 150 MG 24 hr tablet, Take 1 tablet by mouth Daily., Disp: 30 tablet, Rfl: 1  •  galcanezumab-gnlm (EMGALITY) 120 MG/ML auto-injector pen, Inject 1 mL under the skin into the appropriate area as directed Every 28 (Twenty-Eight) Days., Disp: 1.12 mL, Rfl: 11  •  mupirocin (Bactroban) 2 % ointment, Apply  topically to the appropriate area as directed 2 (Two) Times a Day., Disp: 30 g, Rfl: 0  •  SUMAtriptan (Imitrex) 100 MG tablet, Take 1 tablet by mouth 1 (One) Time As Needed for Migraine., Disp: 14 tablet, Rfl: 11    Allergies:   Allergies   Allergen Reactions   • Aspirin Swelling     Swells patients throat shut       Objective     Physical Exam:  Vital Signs: There were no vitals filed for this visit.  There is no height or weight on file to calculate BMI.    Physical Exam  Neurological:      Mental Status: She is alert and oriented to person, place, and time.   Psychiatric:         Mood and Affect: Mood normal.         Behavior: Behavior normal.         Thought Content: Thought content normal.         Judgment: Judgment normal.         Neurologic Exam      Mental Status   Oriented to person, place, and time.      *Physical examination limited due to the nature of a telephone visit.     Assessment / Plan      Assessment/Plan:   Diagnoses and all orders for this visit:    1. Intractable chronic migraine without aura and without status migrainosus (Primary)  -     galcanezumab-gnlm (EMGALITY) 120 MG/ML auto-injector pen; Inject 1 mL under the skin into the appropriate area as directed Every 28 (Twenty-Eight) Days.  Dispense: 1.12 mL; Refill: 11  -     SUMAtriptan (Imitrex) 100 MG tablet; Take 1 tablet by mouth 1 (One) Time As Needed for Migraine.  Dispense: 14 tablet; Refill: 11    2. BMI 25.0-25.9,adult         Follow Up:   Return in about 1 year (around 5/26/2022) for Recheck.     *This was a follow up visit conducted via telephone and it lasted for 11 minutes.      TED Matos, FNP-C  [facility]       Please note that portions of this note may have been completed with a voice recognition program. Efforts were made to edit the dictations, but occasionally words are mistranscribed.

## 2021-05-29 DIAGNOSIS — F33.0 MILD EPISODE OF RECURRENT MAJOR DEPRESSIVE DISORDER (HCC): ICD-10-CM

## 2021-06-01 ENCOUNTER — TELEPHONE (OUTPATIENT)
Dept: NEUROLOGY | Facility: CLINIC | Age: 36
End: 2021-06-01

## 2021-06-01 DIAGNOSIS — G43.719 INTRACTABLE CHRONIC MIGRAINE WITHOUT AURA AND WITHOUT STATUS MIGRAINOSUS: ICD-10-CM

## 2021-06-01 RX ORDER — BUPROPION HYDROCHLORIDE 150 MG/1
TABLET ORAL
Qty: 90 TABLET | Refills: 3 | Status: SHIPPED | OUTPATIENT
Start: 2021-06-01 | End: 2022-06-22

## 2021-06-01 NOTE — TELEPHONE ENCOUNTER
Caller: Zeenat Carpenter    Relationship: Self    Best call back number :[phone]  Medication needed:   Requested Prescriptions      No prescriptions requested or ordered in this encounter       When do you need the refill by: ASAP    What additional details did the patient provide when requesting the medication: NEED PRE AUTH.     Does the patient have less than a 3 day supply:  [x] Yes  [] No    What is the patient's preferred pharmacy:             [place] [address] [facility] - [phone] [facility] [phone]   [phone]       PT NEEDS TO HAVE RX EMGALITY . PRE AUTH SENT TO PHARMACY        PLEASE ADVISE

## 2021-06-07 NOTE — TELEPHONE ENCOUNTER
Provider: JUANA FIGUEROA   Caller: BURAK   Relationship to Patient: [facility]   Phone Number: [phone]  Reason for Call: CALLING TO SEE IF AUTHORIZATION HAS BEEN COMPLETED.

## 2021-06-07 NOTE — TELEPHONE ENCOUNTER
Provider: HENRY  Caller: PT  Relationship to Patient: SELF  Ph[facility]acy: [id number]  Phone Number: [phone]  Reason for Call: PT STILL HASN'T GOTTEN HER RX STRAIGHTENED OUT; PHARMACY REPORTS THEY ARE WAITING ON CLINIC FOR PRE AUTH.    PLEASE CALL PHARMACY & ADVISE.    THANK YOU.

## 2022-06-22 ENCOUNTER — OFFICE VISIT (OUTPATIENT)
Dept: NEUROLOGY | Facility: CLINIC | Age: 37
End: 2022-06-22

## 2022-06-22 VITALS
DIASTOLIC BLOOD PRESSURE: 80 MMHG | HEIGHT: 66 IN | HEART RATE: 71 BPM | TEMPERATURE: 97.1 F | SYSTOLIC BLOOD PRESSURE: 100 MMHG | WEIGHT: 130.4 LBS | OXYGEN SATURATION: 99 % | BODY MASS INDEX: 20.96 KG/M2

## 2022-06-22 DIAGNOSIS — G43.719 INTRACTABLE CHRONIC MIGRAINE WITHOUT AURA AND WITHOUT STATUS MIGRAINOSUS: Primary | ICD-10-CM

## 2022-06-22 PROCEDURE — 99213 OFFICE O/P EST LOW 20 MIN: CPT | Performed by: NURSE PRACTITIONER

## 2022-06-22 RX ORDER — SUMATRIPTAN 100 MG/1
100 TABLET, FILM COATED ORAL ONCE AS NEEDED
Qty: 14 TABLET | Refills: 11 | Status: SHIPPED | OUTPATIENT
Start: 2022-06-22

## 2022-06-22 NOTE — PROGRESS NOTES
Follow Up Office Visit      Patient Name: Zeenat Carpenter  : 1985   MRN: [id number]     Chief Complaint:    Chief Complaint   Patient presents with   • Follow-up     Patient in office to follow up on migraines.        History of Present Illness: Zeenat Carpenter is a 36 y.o. female who is here today to follow up with migraines and was last seen on 2021.  She is taking Emgality monthly and Imitrex PRN.  She is doing well and has not been needing Imitrex, much at all, since being on the Emgality.  Her migraines have been very well controlled with Emgality.  She would like to continue her current regimen.  She was diagnosed with COVID-19 about 5 days ago and is really hoping it doesn't make her migraines worse.      Following taken from previous visit note:  Zeenat Carpenter is a 35 y.o. female who presents today for a telephone visit, per patient request, to follow up for migraines.  She is taking Emgality and Imitrex and feels her migraines have improved greatly since starting the Emgality.  She denies any negative side effects from the medications.  She has had 1 mild migraine since her last visit in 2021.  She feels her quality of life has improved greatly since her migraines have been under better control.  She would like to continue her current therapy.      Subjective      Review of Systems:   Review of Systems   Constitutional: Negative for chills, fatigue and fever.   HENT: Negative for facial swelling, hearing loss, sore throat, tinnitus and trouble swallowing.    Eyes: Negative for blurred vision, double vision, photophobia and visual disturbance.   Respiratory: Negative for cough, chest tightness and shortness of breath.    Cardiovascular: Negative for chest pain, palpitations and leg swelling.   Gastrointestinal: Negative for abdominal pain, nausea and vomiting.   Endocrine: Negative for cold intolerance and heat intolerance.   Musculoskeletal: Negative for gait problem, neck pain  "and neck stiffness.   Skin: Negative for color change and rash.   Allergic/Immunologic: Negative for environmental allergies and food allergies.   Neurological: Positive for headache. Negative for dizziness, syncope, speech difficulty, weakness, light-headedness, numbness and memory problem.   Psychiatric/Behavioral: Negative for behavioral problems, sleep disturbance and depressed mood. The patient is not nervous/anxious.        I have reviewed and the following portions of the patient's history were updated as appropriate: past family history, past medical history, past social history, past surgical history and problem list.    Medications:     Current Outpatient Medications:   •  galcanezumab-gnlm (EMGALITY) 120 MG/ML auto-injector pen, Inject 1 mL under the skin into the appropriate area as directed Every 28 (Twenty-Eight) Days., Disp: 1.12 mL, Rfl: 11  •  SUMAtriptan (Imitrex) 100 MG tablet, Take 1 tablet by mouth 1 (One) Time As Needed for Migraine., Disp: 14 tablet, Rfl: 11    Allergies:   Allergies   Allergen Reactions   • Aspirin Swelling     Swells patients throat shut       Objective     Physical Exam:  Vital Signs:   Vitals:    06/22/22 1433   BP: 100/80   BP Location: Right arm   Patient Position: Sitting   Cuff Size: Adult   Pulse: 71   Temp: 97.1 °F (36.2 °C)   SpO2: 99%   Weight: 59.1 kg (130 lb 6.4 oz)   Height: 167.6 cm (66\")   PainSc: 0-No pain     Body mass index is 21.05 kg/m².    Physical Exam  Vitals and nursing note reviewed.   Constitutional:       General: She is not in acute distress.     Appearance: Normal appearance. She is well-developed. She is not diaphoretic.   HENT:      Head: Normocephalic and atraumatic.   Eyes:      Extraocular Movements: Extraocular movements intact.      Conjunctiva/sclera: Conjunctivae normal.      Pupils: Pupils are equal, round, and reactive to light.   Pulmonary:      Effort: Pulmonary effort is normal. No respiratory distress.   Musculoskeletal:         " General: Normal range of motion.   Skin:     General: Skin is dry.      Findings: No rash.   Neurological:      Mental Status: She is alert and oriented to person, place, and time.   Psychiatric:         Mood and Affect: Mood normal.         Behavior: Behavior normal.         Thought Content: Thought content normal.         Judgment: Judgment normal.         Neurologic Exam     Mental Status   Oriented to person, place, and time.     Cranial Nerves     CN III, IV, VI   Pupils are equal, round, and reactive to light.       Assessment / Plan      Assessment/Plan:   Diagnoses and all orders for this visit:    1. Intractable chronic migraine without aura and without status migrainosus (Primary)  -     galcanezumab-gnlm (EMGALITY) 120 MG/ML auto-injector pen; Inject 1 mL under the skin into the appropriate area as directed Every 28 (Twenty-Eight) Days.  Dispense: 1.12 mL; Refill: 11  -     SUMAtriptan (Imitrex) 100 MG tablet; Take 1 tablet by mouth 1 (One) Time As Needed for Migraine.  Dispense: 14 tablet; Refill: 11    2. BMI 21.0-21.9, adult       Follow Up:   Return in about 1 year (around 6/22/2023) for Follow Up.    TED Matos, FNP-C  [facility]       Please note that portions of this note may have been completed with a voice recognition program. Efforts were made to edit the dictations, but occasionally words are mistranscribed.

## 2022-06-28 ENCOUNTER — TELEPHONE (OUTPATIENT)
Dept: NEUROLOGY | Facility: CLINIC | Age: 37
End: 2022-06-28

## 2022-06-28 NOTE — TELEPHONE ENCOUNTER
Patient returned call. Let her know the pa has been submitted to insurance just waiting for a response.

## 2022-06-28 NOTE — TELEPHONE ENCOUNTER
Caller: Zeenat Carpenter    Relationship: Self    Best call back number: [phone]        What was the call regarding: PATIENT CALLED STATING HER PHARMACY NOTIFIED HER THAT HER INSURANCE IS REQUIRING AN UPDATED PA FOR EMGALITY. PATIENT STATES HER PHARMACY FAXED OVER PA INFORMATION.PATIENT WANTING TO ENSURE THAT FAX WAS RECEIVED AND CHECK IF PA HAS BEEN INITIATED    Do you require a callback: YES       PLEASE REVIEW AND ADVISE

## 2023-01-09 ENCOUNTER — TELEPHONE (OUTPATIENT)
Dept: NEUROLOGY | Facility: CLINIC | Age: 38
End: 2023-01-09
Payer: MEDICAID

## 2023-01-09 NOTE — TELEPHONE ENCOUNTER
Provider: JUANA FIGUEROA  Caller: PATIENT  Relationship to Patient: SELF  Phone Number: [phone]  Reason for Call: PATIENT CALLED STATING SHE HAS BEEN EXPERIENCING NEW SYMPTOMS AND WOULD LIKE TO SCHEDULE APPT. PATIENT HAS BEEN EXPERIENCING TONGUE NUMBNESS, BOTTOM LIP DRAWING IN, AND POSSIBLY SLIGHT FACIAL SPASMS. PATIENT STATES THESE HAPPENED WHEN SHE HAD A BAD MIGRAINE RECENTLY. PATIENT WAS SCHEDULED FOR FOLLOW UP 2/16/23. SENDING MESSAGE PER [facility]ACT[facility] ADVISORY.    PLEASE REVIEW AND ADVISE.

## 2023-01-10 NOTE — TELEPHONE ENCOUNTER
ATTEMPTED TO CONTACT PATIENT TO OFFER 10 AM APPT FOR TOMORROW.     NO ANSWER WHEN CALLING, LVM FOR PATIENT TO RETURN CALL.

## 2023-01-11 ENCOUNTER — OFFICE VISIT (OUTPATIENT)
Dept: NEUROLOGY | Facility: CLINIC | Age: 38
End: 2023-01-11
Payer: MEDICAID

## 2023-01-11 VITALS
OXYGEN SATURATION: 97 % | SYSTOLIC BLOOD PRESSURE: 110 MMHG | WEIGHT: 136.8 LBS | HEIGHT: 66 IN | TEMPERATURE: 97.3 F | BODY MASS INDEX: 21.98 KG/M2 | DIASTOLIC BLOOD PRESSURE: 80 MMHG | HEART RATE: 87 BPM

## 2023-01-11 DIAGNOSIS — G43.719 INTRACTABLE CHRONIC MIGRAINE WITHOUT AURA AND WITHOUT STATUS MIGRAINOSUS: Primary | ICD-10-CM

## 2023-01-11 DIAGNOSIS — Q38.3 ABNORMALITY OF TONGUE: ICD-10-CM

## 2023-01-11 DIAGNOSIS — G24.5 BLEPHAROSPASM OF BOTH EYES: ICD-10-CM

## 2023-01-11 DIAGNOSIS — M62.838 OTHER MUSCLE SPASM: ICD-10-CM

## 2023-01-11 PROCEDURE — 99214 OFFICE O/P EST MOD 30 MIN: CPT | Performed by: NURSE PRACTITIONER

## 2023-01-11 RX ORDER — RIMEGEPANT SULFATE 75 MG/75MG
75 TABLET, ORALLY DISINTEGRATING ORAL ONCE
Qty: 4 TABLET | Refills: 0 | COMMUNITY
Start: 2023-01-11 | End: 2023-01-11

## 2023-01-11 NOTE — PROGRESS NOTES
"     Follow Up Office Visit      Patient Name: Zeenat Carpenter  : 1985   MRN: [id number]     Chief Complaint:    Chief Complaint   Patient presents with   • Follow-up     Patient in office to follow up on migraines.       History of Present Illness: Zeenat Carpenter is a 37 y.o. female who is here today to follow up with migraines and was last seen in 2022.  She was taking Emgality monthly but ran out of that right after her previous visit- says [facility] wouldn't give it to her even though we have an authorization on file.  The Emgality was controlling her migraines very well.  She feels her migraines returned a couple of months ago.  She experienced some tongue numbness and \"drawing of my lip\" about a week and a half ago- symptoms lasted about a day, then went away.  She says she still feels her tongue is \"getting ready to spasm and just feels weird\".  She states, \"I haven't really felt like myself for a while, but I'm in therapy for that\".  She has been experiencing some severe depression and has been seeing the same therapist for at least the last 6 months- her uncle recently passed away and she is getting ready to go out of town for his .  She has Imitrex to take PRN migraine but doesn't like the way she feels 2-3 days after taking it.  She does mention she has spasms around her eyes, most frequently around left eye.      Following taken from previous visit note:  Zeenat Carpenter is a 36 y.o. female who is here today to follow up with migraines and was last seen on 2021.  She is taking Emgality monthly and Imitrex PRN.  She is doing well and has not been needing Imitrex, much at all, since being on the Emgality.  Her migraines have been very well controlled with Emgality.  She would like to continue her current regimen.  She was diagnosed with COVID-19 about 5 days ago and is really hoping it doesn't make her migraines worse.      Subjective      Review of Systems:   Review of Systems "   Constitutional: Negative for chills, fatigue and fever.   HENT: Negative for facial swelling, hearing loss, sore throat, tinnitus and trouble swallowing.    Eyes: Negative for blurred vision, double vision, photophobia and visual disturbance.   Respiratory: Negative for cough, chest tightness and shortness of breath.    Cardiovascular: Negative for chest pain, palpitations and leg swelling.   Gastrointestinal: Negative for abdominal pain, nausea and vomiting.   Endocrine: Negative for cold intolerance and heat intolerance.   Musculoskeletal: Negative for gait problem, neck pain and neck stiffness.   Skin: Negative for color change and rash.   Allergic/Immunologic: Negative for environmental allergies and food allergies.   Neurological: Positive for headache. Negative for dizziness, syncope, speech difficulty, weakness, light-headedness, numbness and memory problem.   Psychiatric/Behavioral: Positive for depressed mood. Negative for behavioral problems, sleep disturbance and suicidal ideas. The patient is not nervous/anxious.        I have reviewed and the following portions of the patient's history were updated as appropriate: past family history, past medical history, past social history, past surgical history and problem list.    Medications:     Current Outpatient Medications:   •  galcanezumab-gnlm (EMGALITY) 120 MG/ML auto-injector pen, Inject 1 mL under the skin into the appropriate area as directed Every 28 (Twenty-Eight) Days., Disp: 1.12 mL, Rfl: 11  •  SUMAtriptan (Imitrex) 100 MG tablet, Take 1 tablet by mouth 1 (One) Time As Needed for Migraine., Disp: 14 tablet, Rfl: 11  •  galcanezumab-gnlm (EMGALITY) 120 MG/ML auto-injector pen, Inject 2 mL under the skin into the appropriate area as directed 1 (One) Time for 1 dose., Disp: 2.24 mL, Rfl: 0  •  Rimegepant Sulfate (Nurtec) 75 MG tablet dispersible tablet, Take 1 tablet by mouth 1 (One) Time for 1 dose., Disp: 4 tablet, Rfl: 0  •  ubrogepant 100 MG  "tablet, Take 1 tablet by mouth 1 (One) Time for 1 dose., Disp: 4 tablet, Rfl: 0    Allergies:   Allergies   Allergen Reactions   • Aspirin Swelling     Swells patients throat shut       Objective     Physical Exam:  Vital Signs:   Vitals:    01/11/23 1402   BP: 110/80   BP Location: Right arm   Patient Position: Sitting   Cuff Size: Adult   Pulse: 87   Temp: 97.3 °F (36.3 °C)   SpO2: 97%   Weight: 62.1 kg (136 lb 12.8 oz)   Height: 167.6 cm (66\")   PainSc:   4   PainLoc: Head  Comment: headache     Body mass index is 22.08 kg/m².    Physical Exam  Vitals and nursing note reviewed.   Constitutional:       General: She is not in acute distress.     Appearance: Normal appearance. She is well-developed and normal weight. She is not diaphoretic.   HENT:      Head: Normocephalic and atraumatic.   Eyes:      Extraocular Movements: Extraocular movements intact.      Conjunctiva/sclera: Conjunctivae normal.      Pupils: Pupils are equal, round, and reactive to light.   Pulmonary:      Effort: Pulmonary effort is normal. No respiratory distress.   Musculoskeletal:         General: Normal range of motion.   Skin:     General: Skin is warm and dry.      Findings: No rash.   Neurological:      Mental Status: She is alert and oriented to person, place, and time.   Psychiatric:         Attention and Perception: Attention normal.         Mood and Affect: Affect is tearful.         Speech: Speech normal.         Behavior: Behavior normal.         Thought Content: Thought content normal.         Judgment: Judgment normal.         Neurologic Exam     Mental Status   Oriented to person, place, and time.   Speech: speech is normal     Cranial Nerves     CN III, IV, VI   Pupils are equal, round, and reactive to light.       Assessment / Plan      Assessment/Plan:   Diagnoses and all orders for this visit:    1. Intractable chronic migraine without aura and without status migrainosus (Primary)  -     Rimegepant Sulfate (Nurtec) 75 MG " tablet dispersible tablet; Take 1 tablet by mouth 1 (One) Time for 1 dose.  Dispense: 4 tablet; Refill: 0  -     ubrogepant 100 MG tablet; Take 1 tablet by mouth 1 (One) Time for 1 dose.  Dispense: 4 tablet; Refill: 0  -     galcanezumab-gnlm (EMGALITY) 120 MG/ML auto-injector pen; Inject 2 mL under the skin into the appropriate area as directed 1 (One) Time for 1 dose.  Dispense: 2.24 mL; Refill: 0  -     CT Head Without Contrast; Future    2. Blepharospasm of both eyes    3. Other muscle spasm  -     CT Head Without Contrast; Future    4. Abnormality of tongue  -     CT Head Without Contrast; Future    5. BMI 22.0-22.9, adult    *Education on Blepharospasms provided today. Advised her to let provider know if they return and become extremely bothersome.   *Indications and possible SEs of Ubrelvy and Nurtec discussed today.   *I have advised patient to go to the ED or call 911 for any signs or symptoms of stroke, as we discussed.     Follow Up:   Return in about 3 months (around 4/11/2023) for Follow Up.    TED Matos, FNP-C  [facility]       Please note that portions of this note may have been completed with a voice recognition program. Efforts were made to edit the dictations, but occasionally words are mistranscribed.

## 2023-01-26 ENCOUNTER — HOSPITAL ENCOUNTER (OUTPATIENT)
Dept: CT IMAGING | Facility: HOSPITAL | Age: 38
Discharge: HOME | End: 2023-01-26
Admitting: NURSE PRACTITIONER
Payer: MEDICAID

## 2023-01-26 DIAGNOSIS — G43.719 INTRACTABLE CHRONIC MIGRAINE WITHOUT AURA AND WITHOUT STATUS MIGRAINOSUS: ICD-10-CM

## 2023-01-26 DIAGNOSIS — Q38.3 ABNORMALITY OF TONGUE: ICD-10-CM

## 2023-01-26 DIAGNOSIS — M62.838 OTHER MUSCLE SPASM: ICD-10-CM

## 2023-01-26 PROCEDURE — 70450 CT HEAD/BRAIN W/O DYE: CPT

## 2023-03-31 ENCOUNTER — HOSPITAL ENCOUNTER (EMERGENCY)
Facility: HOSPITAL | Age: 38
Discharge: HOME | End: 2023-03-31
Attending: STUDENT IN AN ORGANIZED HEALTH CARE EDUCATION/TRAINING PROGRAM | Admitting: STUDENT IN AN ORGANIZED HEALTH CARE EDUCATION/TRAINING PROGRAM
Payer: MEDICAID

## 2023-03-31 VITALS
DIASTOLIC BLOOD PRESSURE: 73 MMHG | OXYGEN SATURATION: 100 % | TEMPERATURE: 98.3 F | RESPIRATION RATE: 18 BRPM | BODY MASS INDEX: 21.53 KG/M2 | HEIGHT: 66 IN | SYSTOLIC BLOOD PRESSURE: 122 MMHG | HEART RATE: 68 BPM | WEIGHT: 134 LBS

## 2023-03-31 DIAGNOSIS — F41.9 ANXIETY: Primary | ICD-10-CM

## 2023-03-31 PROCEDURE — 99283 EMERGENCY DEPT VISIT LOW MDM: CPT

## 2023-03-31 RX ORDER — FLUOXETINE HYDROCHLORIDE 20 MG/1
20 CAPSULE ORAL DAILY
COMMUNITY

## 2023-03-31 RX ORDER — BUPROPION HYDROCHLORIDE 75 MG/1
75 TABLET ORAL 2 TIMES DAILY
COMMUNITY

## 2023-03-31 RX ORDER — ALPRAZOLAM 0.5 MG/1
0.5 TABLET ORAL NIGHTLY PRN
Status: DISCONTINUED | OUTPATIENT
Start: 2023-03-31 | End: 2023-04-01 | Stop reason: HOSPADM

## 2023-03-31 RX ADMIN — ALPRAZOLAM 0.5 MG: 0.5 TABLET ORAL at 23:50

## 2023-04-01 NOTE — DISCHARGE INSTRUCTIONS
Please call your doctor tomorrow to discuss weaning yourself off of the Wellbutrin if you feel as though it is making her symptoms worse.  Please return to the ER at any time if your symptoms worsen

## 2023-04-01 NOTE — ED PROVIDER NOTES
Subjective  History of Present Illness:    Chief Complaint:   Chief Complaint   Patient presents with   • Wellness Check      History of Present Illness: Zeenat Carpenter is a 37 y.o. female who presents to the emergency department complaining of started prozac one month ago and wellbutrin less than a week ago. Was having suicidal thoughts when on wellbutrin one year ago. Began talking with therapist and Wednesday she told her to come to the ED if sxs worsen.  States the Prozac was not necessarily making her feel much better and it was recommended she start Wellbutrin about 5 days ago. Pt states she is not having suicidal thoughts at this time, but wants to be seen so she doesn't start having them. States since being on wellbutrin she is more anxious and depressed.    Onset: About 5 days ago  Duration: Ongoing  Exacerbating / Alleviating factors: Worse with increased stressors  Associated symptoms: None, specifically no suicidal or homicidal ideations      Nurses Notes reviewed and agree, including vitals, allergies, social history and prior medical history.     Review of Systems   HENT: Negative.    Eyes: Negative.    Respiratory: Negative.    Cardiovascular: Negative.    Gastrointestinal: Negative.    Genitourinary: Negative.    Musculoskeletal: Negative.    Skin: Negative.    Neurological: Negative.    Psychiatric/Behavioral: Negative for suicidal ideas.        Depression and anxiety       Past Medical History:   Diagnosis Date   • Arthritis    • Depression    • Migraine        Allergies:    Aspirin      Past Surgical History:   Procedure Laterality Date   • DILATATION AND CURETTAGE           Social History     Socioeconomic History   • Marital status:    Tobacco Use   • Smoking status: Never   • Smokeless tobacco: Never   Vaping Use   • Vaping Use: Never used   Substance and Sexual Activity   • Alcohol use: Yes     Comment: occas   • Drug use: Never   • Sexual activity: Defer         Family History  "  Problem Relation Age of Onset   • Migraines Sister    • COPD Father    • Cancer Father    • Cancer Maternal Uncle    • Cancer Maternal Grandmother        Objective  Physical Exam:  /73 (BP Location: Left arm, Patient Position: Lying)   Pulse 68   Temp 98.3 °F (36.8 °C) (Oral)   Resp 18   Ht 167.6 cm (66\")   Wt 60.8 kg (134 lb)   LMP 03/13/2023 (Approximate)   SpO2 100%   BMI 21.63 kg/m²      Physical Exam  Vitals and nursing note reviewed.   Constitutional:       General: She is not in acute distress.     Appearance: Normal appearance. She is normal weight. She is not ill-appearing, toxic-appearing or diaphoretic.   HENT:      Head: Normocephalic and atraumatic.      Nose: Nose normal.   Eyes:      Extraocular Movements: Extraocular movements intact.   Cardiovascular:      Rate and Rhythm: Normal rate.      Heart sounds: Normal heart sounds.   Pulmonary:      Effort: Pulmonary effort is normal.   Abdominal:      General: Abdomen is flat.   Musculoskeletal:         General: Normal range of motion.      Cervical back: Normal range of motion.   Skin:     General: Skin is warm and dry.   Neurological:      General: No focal deficit present.      Mental Status: She is alert and oriented to person, place, and time. Mental status is at baseline.   Psychiatric:         Mood and Affect: Mood normal.         Behavior: Behavior normal.           Procedures    ED Course:         Lab Results (last 24 hours)     ** No results found for the last 24 hours. **           No radiology results from the last 24 hrs       MELVIN Carpenter is a 37 y.o. female who presents to the emergency department for evaluation of increased anxiety and depression    Differential diagnosis includes anxiety, depression, medication reaction among other etiologies.      Chart review if available included outside testing, previous visits, prior labs, prior imaging, available notes from prior evaluations or visits with specialists, " medication list, allergies, past medical history, past surgical history when applicable.    Patient was treated with Xanax    Patient states he took Vistaril prior to arrival, she states she is wishing for medication adjustment.  Explained her she would need to discuss with the prescribing physician regarding this.  Did offer her a single dose of something for anxiety here tonight however she is not suicidal nor homicidal, she states she does not want to get to the point where she was in the past on the Wellbutrin she would like to discontinue this.  Did tell her to speak with her doctor tomorrow regarding this.  She does not wish to wait to speak with a behavioral health person here and has no desire to go inpatient for psychiatric help at this time.  She states she feels safe going home and can have someone come and pick her up she states she feels much better just being here at this time.    Plan for disposition is discharge home.  Patient/family comfortable with and understanding of the plan.      Final diagnoses:   Anxiety        Ran Dan PA-C  04/01/23 0147

## 2023-04-13 ENCOUNTER — TELEPHONE (OUTPATIENT)
Dept: NEUROLOGY | Facility: CLINIC | Age: 38
End: 2023-04-13

## 2023-04-13 DIAGNOSIS — G43.909 EPISODIC MIGRAINE: Primary | ICD-10-CM

## 2023-04-13 NOTE — TELEPHONE ENCOUNTER
PT WAS TO BE SEEN TODAY BUT HAS A SICK CHILD.    PT HAS BEEN R/S TO [facility], 10/25/23.    PT REPORTS THAT UBRELVY IS THE MEDICATION SAMPLE THAT IS WORKING VERY WELL FOR HER.  PT IS REQUESTING A PRESCRIPTION FOR UBRELVY BE SENT TO HER PHARMACY:    [facility] [place] - [address] [facility] - [phone] [facility] [phone] FX      PLEASE ADVISE PATIENT    THANK YOU

## 2023-07-25 ENCOUNTER — TELEPHONE (OUTPATIENT)
Dept: NEUROLOGY | Facility: CLINIC | Age: 38
End: 2023-07-25
Payer: MEDICAID

## 2023-07-25 DIAGNOSIS — G43.909 EPISODIC MIGRAINE: Primary | ICD-10-CM

## 2023-07-25 RX ORDER — RIZATRIPTAN BENZOATE 10 MG/1
10 TABLET ORAL ONCE AS NEEDED
Qty: 9 TABLET | Refills: 5 | Status: SHIPPED | OUTPATIENT
Start: 2023-07-25

## 2023-07-25 NOTE — TELEPHONE ENCOUNTER
ATTEMPTED TO CONTACT PATIENT TO LET HER KNOW THAT HER INSURANCE DENIED HER UBRLEVY BECAUSE SHE HAS NOT TRIED AND FAILED 2 TRIPTANS.     JUANA HAS PRESCRIBED HER MAXALT 10MG PRN FOR HER TO TRY.     NO ANSWER WHEN CALLING PATIENT, LVM TO RETURN CALL.     OKAY FOR HUB STAFF TO RELAY MESSAGE.

## 2023-08-15 DIAGNOSIS — G43.719 INTRACTABLE CHRONIC MIGRAINE WITHOUT AURA AND WITHOUT STATUS MIGRAINOSUS: ICD-10-CM

## 2023-08-15 NOTE — TELEPHONE ENCOUNTER
Caller: Zeenat Carpenter    Relationship: Self    Best call back number: [phone]    Requested Prescriptions:   Requested Prescriptions     Pending Prescriptions Disp Refills    galcanezumab-gnlm (EMGALITY) 120 MG/ML auto-injector pen 1.12 mL 11     Sig: Inject 1 mL under the skin into the appropriate area as directed Every 28 (Twenty-Eight) Days.        Pharmacy where request should be sent: [facility] [id number] [phone] [facility] [phone]      Last office visit with prescribing clinician: 1/11/2023   Last telemedicine visit with prescribing clinician: Visit date not found   Next office visit with prescribing clinician: Visit date not found     Additional details provided by patient: PT WAS DUE ON 8-12-23 AND HAS BEEN TOLD BY THE PHARMACY THAT A PA IS NEEDED.    Does the patient have less than a 3 day supply:  [x] Yes  [] No    Would you like a call back once the refill request has been completed: [] Yes [] No    If the office needs to give you a call back, can they leave a voicemail: [] Yes [] No    Devang Sethi Rep   08/15/23 13:15 EDT

## 2023-08-17 ENCOUNTER — TELEPHONE (OUTPATIENT)
Dept: NEUROLOGY | Facility: CLINIC | Age: 38
End: 2023-08-17
Payer: MEDICAID

## 2023-08-17 NOTE — TELEPHONE ENCOUNTER
Caller: NUVIA    Relationship: SELF    Best call back number: [phone]     Which medication are you concerned about: EMGALITY    Who prescribed you this medication: JUANA JEAN    What are your concerns: A P.A IS NEEDED, SHE WAS CHECKING ON STATUS.

## 2023-08-17 NOTE — TELEPHONE ENCOUNTER
Pa request was received yesterday 08/16 submitted PA today 08/17. I have notified the patient and told her to come and  a sample. Until we can get the authorization back from insurance.

## 2023-10-25 ENCOUNTER — OFFICE VISIT (OUTPATIENT)
Dept: NEUROLOGY | Facility: CLINIC | Age: 38
End: 2023-10-25
Payer: MEDICAID

## 2023-10-25 VITALS
OXYGEN SATURATION: 100 % | BODY MASS INDEX: 21.83 KG/M2 | SYSTOLIC BLOOD PRESSURE: 104 MMHG | WEIGHT: 135.8 LBS | HEART RATE: 85 BPM | TEMPERATURE: 98.6 F | HEIGHT: 66 IN | DIASTOLIC BLOOD PRESSURE: 66 MMHG | RESPIRATION RATE: 14 BRPM

## 2023-10-25 DIAGNOSIS — G43.909 EPISODIC MIGRAINE: ICD-10-CM

## 2023-10-25 DIAGNOSIS — G43.719 INTRACTABLE CHRONIC MIGRAINE WITHOUT AURA AND WITHOUT STATUS MIGRAINOSUS: Primary | ICD-10-CM

## 2023-10-25 DIAGNOSIS — F41.9 ANXIETY: ICD-10-CM

## 2023-10-25 DIAGNOSIS — F33.1 MODERATE RECURRENT MAJOR DEPRESSION: ICD-10-CM

## 2023-10-25 PROCEDURE — 1160F RVW MEDS BY RX/DR IN RCRD: CPT | Performed by: NURSE PRACTITIONER

## 2023-10-25 PROCEDURE — 1159F MED LIST DOCD IN RCRD: CPT | Performed by: NURSE PRACTITIONER

## 2023-10-25 PROCEDURE — 99214 OFFICE O/P EST MOD 30 MIN: CPT | Performed by: NURSE PRACTITIONER

## 2023-10-25 RX ORDER — VENLAFAXINE 37.5 MG/1
37.5 TABLET ORAL DAILY
Qty: 30 TABLET | Refills: 2 | Status: SHIPPED | OUTPATIENT
Start: 2023-10-25

## 2023-10-25 NOTE — PROGRESS NOTES
Follow Up Office Visit      Patient Name: Zeenat Carpenter  : 1985   MRN: [id number]     Chief Complaint:    Chief Complaint   Patient presents with    Follow-up     Patient is in office to follow up on migraines. She reports having 10/30 HA days in the past month.        History of Present Illness: Zeenat Carpenter is a 38 y.o. female who is here today to follow up with Neurology for migriane ALVARADO. She was seen in clinic last  ([facility]). She has been using Ubrelvy PRN and Nurtec PRN for abortive and failed Nurtec. Ubrelvy will terminate a migraine. She needs a refill on Emgalitiy. Triggers: stress and anxiety. Tried and Failed: Imitrex, Maxalt, Ubrelvy, Nurtec, Venlafaxine, Emgality. HDM . No aura.     She is an established pt of CBT (Leah Salgado and Lalita Kimble) for talk therapy and trauma therapy. She is a former pt of Psychiatry (Kareem) and has not FU in 2 years. She is not taking medication for her mental health and reports PMH of depression, anxiety and paranoia. She also reports she has been diagnosed with ADHD in past and this is also not being treated. She reports symptoms df depression with crying jags and lack of interest in people and activities. She feels overwhelmed with work and family and balancing her responsibilities. She did Gene Site DNA testing in past with [facility]. She has tried and failed numerous medications: Wellbutrin, Prozac, Alprazolam, Vistaril. No SI/HI. Denies auditory and visual hallucinations.    Social: Happily , G[place]. 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 No recreational drugs, tobacco or ETOH.     Recent Imaging:     CT Head WO  - noncontributory  MRI Brain W/WO 2020 - noncontributory     Pertinent Medical History: Depression, Migraine, Anxiety, ADHD    Subjective      Review of Systems:   Review of Systems   Constitutional: Negative.    HENT: Negative.     Eyes: Negative.    Respiratory: Negative.     Cardiovascular:  "Negative.    Gastrointestinal: Negative.    Endocrine: Negative.    Genitourinary: Negative.    Musculoskeletal: Negative.    Skin: Negative.    Allergic/Immunologic: Negative.    Neurological:  Positive for headache.   Hematological: Negative.    Psychiatric/Behavioral:  Positive for decreased concentration and depressed mood. Negative for self-injury and suicidal ideas. The patient is nervous/anxious.         Paranoia    All other systems reviewed and are negative.      I have reviewed and the following portions of the patient's history were updated as appropriate: past family history, past medical history, past social history, past surgical history and problem list.    Medications:     Current Outpatient Medications:     galcanezumab-gnlm (EMGALITY) 120 MG/ML auto-injector pen, Inject 1 mL under the skin into the appropriate area as directed Every 30 (Thirty) Days., Disp: 1.12 mL, Rfl: 6    ubrogepant (Ubrelvy) 100 MG tablet, Take 1 tablet by mouth As Needed (migraine)., Disp: 10 tablet, Rfl: 6    venlafaxine (EFFEXOR) 37.5 MG tablet, Take 1 tablet by mouth Daily., Disp: 30 tablet, Rfl: 2    Allergies:   Allergies   Allergen Reactions    Aspirin Swelling     Swells patients throat shut       Objective     Physical Exam:  Vital Signs:   Vitals:    10/25/23 1050   BP: 104/66   BP Location: Left arm   Patient Position: Sitting   Cuff Size: Adult   Pulse: 85   Resp: 14   Temp: 98.6 °F (37 °C)   TempSrc: Infrared   SpO2: 100%   Weight: 61.6 kg (135 lb 12.8 oz)   Height: 167.6 cm (65.98\")   PainSc: 0-No pain     Body mass index is 21.93 kg/m².    Physical Exam  Vitals and nursing note reviewed.   Constitutional:       General: She is not in acute distress.     Appearance: Normal appearance. She is normal weight.   HENT:      Head: Normocephalic.      Nose: Nose normal.      Mouth/Throat:      Mouth: Mucous membranes are moist.      Pharynx: Oropharynx is clear.   Eyes:      Extraocular Movements: Extraocular movements " intact.      Conjunctiva/sclera: Conjunctivae normal.      Pupils: Pupils are equal, round, and reactive to light.   Cardiovascular:      Rate and Rhythm: Normal rate and regular rhythm.      Pulses: Normal pulses.      Heart sounds: Normal heart sounds.   Pulmonary:      Effort: Pulmonary effort is normal.      Breath sounds: Normal breath sounds.   Musculoskeletal:         General: Normal range of motion.      Cervical back: Normal range of motion and neck supple. No rigidity.   Skin:     General: Skin is warm and dry.      Capillary Refill: Capillary refill takes less than 2 seconds.   Neurological:      General: No focal deficit present.      Mental Status: She is alert and oriented to person, place, and time.   Psychiatric:         Mood and Affect: Mood normal.         Behavior: Behavior normal.         Thought Content: Thought content normal.         Judgment: Judgment normal.      Comments: TEARFUL          Neurologic Exam     Mental Status   Oriented to person, place, and time.     Cranial Nerves     CN III, IV, VI   Pupils are equal, round, and reactive to light.       Assessment / Plan      Assessment/Plan:   Diagnoses and all orders for this visit:    1. Intractable chronic migraine without aura and without status migrainosus (Primary)  -     venlafaxine (EFFEXOR) 37.5 MG tablet; Take 1 tablet by mouth Daily.  Dispense: 30 tablet; Refill: 2  -     galcanezumab-gnlm (EMGALITY) 120 MG/ML auto-injector pen; Inject 1 mL under the skin into the appropriate area as directed Every 30 (Thirty) Days.  Dispense: 1.12 mL; Refill: 6    2. Episodic migraine  -     ubrogepant (Ubrelvy) 100 MG tablet; Take 1 tablet by mouth As Needed (migraine).  Dispense: 10 tablet; Refill: 6    3. Anxiety  -     Ambulatory Referral to Behavioral Health  -     venlafaxine (EFFEXOR) 37.5 MG tablet; Take 1 tablet by mouth Daily.  Dispense: 30 tablet; Refill: 2    4. Moderate recurrent major depression  -     Ambulatory Referral to  Behavioral Health  -     venlafaxine (EFFEXOR) 37.5 MG tablet; Take 1 tablet by mouth Daily.  Dispense: 30 tablet; Refill: 2         Follow Up:   Return in about 2 weeks (around 11/8/2023), or if symptoms worsen or fail to improve.    Anticipatory Guidance and Safety Reviewed  Patient Education Provided  Will obtain GeneSite Testing results from [facility]  Begin Venlafaxine 37.5 mg Daily for Anxiety, Depression and Migraine Prevention #30; SE reviewed. Instructed to go to ER with onset of SI/HI.   Mental Health Promotion Strategies Reviewed: sleep hygiene, positive social support, mental health counseling and exercise > 150 min/week. She will keep FU appt for CBT/trauma Therapy  Psychiatry Referral for medication mgmt  GAD7 12 (see scanned)  PHQ9 18 (see scanned)  Continue Emgality 120 mg Monthly #1 for prevention with refills x 6; SE reviewed  Continue Ubrelvy 100 mg PRN #9 for abortive with refills x 6; SE reviewed  Encouraged HA journaling for trigger identification and prevention    RTC PRN or within 2-3 weeks or sooner with issues     Kirstie Brody, DNP, APRN, FNP-C  [facility] Neurology and Sleep Medicine

## 2023-10-26 ENCOUNTER — TELEPHONE (OUTPATIENT)
Dept: NEUROLOGY | Facility: CLINIC | Age: 38
End: 2023-10-26

## 2023-10-26 NOTE — TELEPHONE ENCOUNTER
Caller: Zeenat Carpenter    Relationship: Self    Best call back number: [phone]    What is the medical concern/diagnosis: THE EFFEXOR MEDICATION PT WAS GIVEN YESTERDAY    What specialty or service is being requested: ENDOCRINOLOGY    What is the provider, practice or medical service name:     What is the office location: [facility] [place]     What is the office phone number:     Any additional details:  PT WANTS TO SEE AN ENDOCRINOLOGIST BEFORE TAKING THE EFFEXOR.    PLEASE ADVISE

## 2023-12-21 ENCOUNTER — TELEPHONE (OUTPATIENT)
Dept: NEUROLOGY | Facility: CLINIC | Age: 38
End: 2023-12-21

## 2023-12-21 NOTE — TELEPHONE ENCOUNTER
Patient would like to taper off Venlafaxine.  She said her psychiatrist told her it isn't a good option for her due to paranoia.  She was told to get tapering instructions from you.  Please advise.

## 2023-12-21 NOTE — TELEPHONE ENCOUNTER
PATIENT CALLING TO REQUEST INSTRUCTIONS ON DISCONTINUING MEDICATION - PLEASE CONTACT PATIENT AND ADVISE    THANK YOU

## 2024-01-15 DIAGNOSIS — F41.9 ANXIETY: ICD-10-CM

## 2024-01-15 DIAGNOSIS — G43.719 INTRACTABLE CHRONIC MIGRAINE WITHOUT AURA AND WITHOUT STATUS MIGRAINOSUS: ICD-10-CM

## 2024-01-15 DIAGNOSIS — F33.1 MODERATE RECURRENT MAJOR DEPRESSION: ICD-10-CM

## 2024-01-15 RX ORDER — VENLAFAXINE 37.5 MG/1
37.5 TABLET ORAL DAILY
Qty: 30 TABLET | Refills: 0 | Status: SHIPPED | OUTPATIENT
Start: 2024-01-15

## 2024-03-06 DIAGNOSIS — F33.1 MODERATE RECURRENT MAJOR DEPRESSION: ICD-10-CM

## 2024-03-06 DIAGNOSIS — G43.719 INTRACTABLE CHRONIC MIGRAINE WITHOUT AURA AND WITHOUT STATUS MIGRAINOSUS: ICD-10-CM

## 2024-03-06 DIAGNOSIS — F41.9 ANXIETY: ICD-10-CM

## 2024-03-07 RX ORDER — VENLAFAXINE 37.5 MG/1
37.5 TABLET ORAL DAILY
Qty: 30 TABLET | Refills: 0 | Status: SHIPPED | OUTPATIENT
Start: 2024-03-07

## 2024-04-08 DIAGNOSIS — F33.1 MODERATE RECURRENT MAJOR DEPRESSION: ICD-10-CM

## 2024-04-08 DIAGNOSIS — F41.9 ANXIETY: ICD-10-CM

## 2024-04-08 DIAGNOSIS — G43.719 INTRACTABLE CHRONIC MIGRAINE WITHOUT AURA AND WITHOUT STATUS MIGRAINOSUS: ICD-10-CM

## 2024-04-08 RX ORDER — VENLAFAXINE 37.5 MG/1
37.5 TABLET ORAL DAILY
Qty: 30 TABLET | Refills: 0 | Status: SHIPPED | OUTPATIENT
Start: 2024-04-08

## 2024-05-03 DIAGNOSIS — G43.719 INTRACTABLE CHRONIC MIGRAINE WITHOUT AURA AND WITHOUT STATUS MIGRAINOSUS: ICD-10-CM

## 2024-05-03 DIAGNOSIS — F41.9 ANXIETY: ICD-10-CM

## 2024-05-03 DIAGNOSIS — F33.1 MODERATE RECURRENT MAJOR DEPRESSION: ICD-10-CM

## 2024-05-07 RX ORDER — VENLAFAXINE 37.5 MG/1
37.5 TABLET ORAL DAILY
Qty: 30 TABLET | Refills: 0 | Status: SHIPPED | OUTPATIENT
Start: 2024-05-07

## 2025-05-13 ENCOUNTER — TELEMEDICINE (OUTPATIENT)
Dept: FAMILY MEDICINE CLINIC | Facility: TELEHEALTH | Age: 40
End: 2025-05-13

## 2025-05-13 DIAGNOSIS — G43.909 MIGRAINE WITHOUT STATUS MIGRAINOSUS, NOT INTRACTABLE, UNSPECIFIED MIGRAINE TYPE: Primary | ICD-10-CM

## 2025-05-13 RX ORDER — SUMATRIPTAN SUCCINATE 100 MG/1
TABLET ORAL
Qty: 3 TABLET | Refills: 0 | Status: SHIPPED | OUTPATIENT
Start: 2025-05-13

## 2025-05-13 RX ORDER — SUMATRIPTAN 20 MG/1
SPRAY NASAL
Qty: 1 EACH | Refills: 0 | Status: SHIPPED | OUTPATIENT
Start: 2025-05-13 | End: 2025-05-13 | Stop reason: ALTCHOICE

## 2025-05-13 NOTE — PROGRESS NOTES
You have chosen to receive care through a telehealth visit.  Do you consent to use a video/audio connection for your medical care today? Yes     HPI  History of Present Illness  The patient is a 39-year-old female who presents via virtual visit for evaluation of a migraine.    She has been experiencing a persistent migraine for the past 4 days, which she attributes to work-related stress. 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 She reports that her workplace is understaffed, leading to increased stress levels. 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 She has an appointment with her neurologist in August 2025 but is concerned about her monthly Imitrex intake. She has exhausted her supply of Imitrex and is unable to refill it until June 2025 due to lack of insurance. She has taken 7 doses of Imitrex in the past 3 weeks, which provides relief for approximately 6 hours before requiring another dose. She has tried Ubrelvy in the past but discontinued it in 2023 when she started Emgality, which provided relief for about 2 years. She lost her insurance when she changed jobs and stopped taking Emgality, believing she no longer needed it. However, her migraines returned about 6 months ago. She has tried other medications such as Relpax and Maxalt, but only triptans have been effective. She has also tried over-the-counter medications, but none have been effective. She is unable to take ibuprofen or NSAIDs due to severe abdominal pain, which she suspects is due to ulcers. She used to take 800 mg of ibuprofen and 1000 mg of Tylenol for her migraines, but now only takes 1000 mg of Tylenol and Benadryl, which sometimes provides relief. She also takes magnesium tablets and a home remedy of salt, lemon juice, and water, followed by two glasses of  water, but these have not been effective. She typically experiences cluster migraines lasting at least 2 days, and after 3-4 days, she begins to feel delirious. She is considering seeing a different neurologist to get an earlier appointment. She has been under the care of Dr. Bird for the past 10 years. She has requested time off work for the next weekend due to her migraines. She does not smoke. She experiences nausea and vomiting with her migraines, but not today. She does not typically experience an aura with her migraines. She continues to use [facility] in [place]. She describes her current pain as feeling like pressure in her ear and around it, extending down into her jaw and around her neck, with more intensity on the right side. She does not believe she has a sinus infection as she does not have any sinus symptoms, although she does report sinus pressure. She has been taking Tylenol around the clock for the past two days, which has dulled the pain slightly. She has been drinking Gatorade as she believes dehydration may be a trigger for her migraines.    She has Vistaril but has not needed it. She went through a deep depression after her father  and used it mostly for that. She has not had it in probably 6 months to a year now.    SOCIAL HISTORY  She does not smoke.    Review of Systems   Constitutional:  Positive for activity change (decreased) and fatigue (increased).   Eyes: Negative.    Respiratory: Negative.     Cardiovascular: Negative.    Gastrointestinal:  Negative for nausea.   Musculoskeletal: Negative.    Neurological:  Positive for headaches (sinuse area on right).   Hematological: Negative.    Psychiatric/Behavioral: Negative.         Past Medical History:   Diagnosis Date    Arthritis     Cluster headache     Depression     Headache, tension-type 2007    Migraine        Family History   Problem Relation Age of Onset    Migraines Sister     COPD Father     Cancer Father     Cancer  Maternal Uncle     Cancer Maternal Grandmother        Social History     Socioeconomic History    Marital status:    Tobacco Use    Smoking status: Never     Passive exposure: Never    Smokeless tobacco: Never   Vaping Use    Vaping status: Every Day    Substances: THC    Devices: Disposable    Passive vaping exposure: Yes   Substance and Sexual Activity    Alcohol use: Not Currently     Comment: occas    Drug use: Never    Sexual activity: Yes     Partners: Male     Birth control/protection: Vasectomy         There were no vitals taken for this visit.    PHYSICAL EXAM  Physical Exam   Constitutional: She is oriented to person, place, and time. She appears well-developed and well-nourished. She does not have a sickly appearance. She does not appear ill. No distress.   HENT:   Head: Normocephalic and atraumatic. Macrocephalic and microcephalic.   Right Ear: Hearing normal.   Left Ear: Hearing normal.   Nose: Nose normal. Right sinus exhibits no maxillary sinus tenderness and no frontal sinus tenderness. Left sinus exhibits no maxillary sinus tenderness and no frontal sinus tenderness.   Mouth/Throat: Mouth/Lips are normal.  Pulmonary/Chest: Effort normal. She is in respiratory distress.  Neurological: She is alert and oriented to person, place, and time.   Psychiatric: She has a normal mood and affect.   Vitals reviewed.    Physical Exam      Diagnoses and all orders for this visit:    1. Migraine without status migrainosus, not intractable, unspecified migraine type (Primary)  -     SUMAtriptan (IMITREX) 20 MG/ACT nasal spray; One spray prn migraine headache pain. May repeat in 2 hours prn persistent pain. Do not exceed 2 sprays in 24 hours.  Dispense: 1 each; Refill: 0      Assessment & Plan  1. Migraine.  - She has been experiencing a migraine for 4 days and is out of Imitrex. She can not see her neurologist until August 2025 and is concerned about the cost of her medications, as she will not have insurance  until June 2025.  - She was advised to rest in a cool, dark room and consume small amounts of food to avoid an empty stomach. The potential benefits of caffeine in alleviating migraines were discussed.  - A prescription for sumatriptan nasal spray was provided, with instructions on its proper use.  - She was advised to continue taking Tylenol as needed and to maintain adequate hydration. If the cost of the nasal spray exceeds $100, she should contact us for an alternative prescription.      FOLLOW-UP  As discussed during visit with [facility], if symptoms worsen or fail to improve, follow-up with PCP/Urgent Care/Emergency Department.    Patient verbalizes understanding of medications, instructions for treatment and follow-up.    Patient or patient representative verbalized consent for the use of Ambient Listening during the visit with  TED Alexander for chart documentation. 5/13/2025  09:30 EDT    TED Alexander  05/13/2025  09:30 EDT    The use of a video visit has been reviewed with the patient and verbal informed consent has been obtained. Myself and Zeenat Carpenter participated in this visit. The patient is located in  [facility], and I am located in [place], [place]. [employer] and [employer] were utilized.

## 2025-08-04 ENCOUNTER — OFFICE VISIT (OUTPATIENT)
Dept: NEUROLOGY | Facility: CLINIC | Age: 40
End: 2025-08-04
Payer: COMMERCIAL

## 2025-08-04 VITALS
HEART RATE: 78 BPM | BODY MASS INDEX: 25.63 KG/M2 | TEMPERATURE: 97.1 F | WEIGHT: 159.5 LBS | HEIGHT: 66 IN | OXYGEN SATURATION: 98 % | SYSTOLIC BLOOD PRESSURE: 110 MMHG | DIASTOLIC BLOOD PRESSURE: 68 MMHG

## 2025-08-04 DIAGNOSIS — G43.909 EPISODIC MIGRAINE: ICD-10-CM

## 2025-08-04 DIAGNOSIS — G43.701 CHRONIC MIGRAINE WITHOUT AURA WITH STATUS MIGRAINOSUS, NOT INTRACTABLE: Primary | ICD-10-CM

## 2025-08-04 PROCEDURE — 99214 OFFICE O/P EST MOD 30 MIN: CPT | Performed by: NURSE PRACTITIONER

## 2025-08-04 RX ORDER — SUMATRIPTAN SUCCINATE 100 MG/1
TABLET ORAL
Qty: 9 TABLET | Refills: 6 | Status: SHIPPED | OUTPATIENT
Start: 2025-08-04